# Patient Record
Sex: FEMALE | Race: OTHER | HISPANIC OR LATINO | URBAN - METROPOLITAN AREA
[De-identification: names, ages, dates, MRNs, and addresses within clinical notes are randomized per-mention and may not be internally consistent; named-entity substitution may affect disease eponyms.]

---

## 2019-10-18 ENCOUNTER — EMERGENCY (EMERGENCY)
Facility: HOSPITAL | Age: 39
LOS: 1 days | Discharge: HOME | End: 2019-10-18
Attending: EMERGENCY MEDICINE | Admitting: EMERGENCY MEDICINE
Payer: COMMERCIAL

## 2019-10-18 VITALS
SYSTOLIC BLOOD PRESSURE: 112 MMHG | HEART RATE: 100 BPM | HEIGHT: 62 IN | OXYGEN SATURATION: 100 % | RESPIRATION RATE: 18 BRPM | WEIGHT: 89.07 LBS | TEMPERATURE: 98 F | DIASTOLIC BLOOD PRESSURE: 72 MMHG

## 2019-10-18 DIAGNOSIS — N75.0 CYST OF BARTHOLIN'S GLAND: ICD-10-CM

## 2019-10-18 DIAGNOSIS — R10.2 PELVIC AND PERINEAL PAIN: ICD-10-CM

## 2019-10-18 PROCEDURE — 56420 I&D BARTHOLINS GLAND ABSCESS: CPT | Mod: RT

## 2019-10-18 PROCEDURE — 99283 EMERGENCY DEPT VISIT LOW MDM: CPT | Mod: 25

## 2019-10-18 RX ORDER — OXYCODONE AND ACETAMINOPHEN 5; 325 MG/1; MG/1
1 TABLET ORAL ONCE
Refills: 0 | Status: DISCONTINUED | OUTPATIENT
Start: 2019-10-18 | End: 2019-10-18

## 2019-10-18 RX ADMIN — OXYCODONE AND ACETAMINOPHEN 1 TABLET(S): 5; 325 TABLET ORAL at 15:38

## 2019-10-18 RX ADMIN — OXYCODONE AND ACETAMINOPHEN 1 TABLET(S): 5; 325 TABLET ORAL at 15:35

## 2019-10-18 NOTE — ED PROVIDER NOTE - CROS ED ROS STATEMENT
rn spoke with dr Adan Martínez regarding pt wearing vent at  Night, he states to transfer pt back to intermediate, to call with further orders and call jonathon freed for vent settings, physician also states to call him when pt arrives to icu for further orders  Electronically signed by José Miguel Weller RN on 9/10/2019 at 8:53 PM all other ROS negative except as per HPI

## 2019-10-18 NOTE — ED PROVIDER NOTE - PATIENT PORTAL LINK FT
You can access the FollowMyHealth Patient Portal offered by [facility] by registering at the following website: [email or web address]. By joining [employer] [facility]’s FollowMyHealth portal, you will also be able to view your health information using other applications (apps) compatible with our system.

## 2019-10-18 NOTE — ED PROVIDER NOTE - NSFOLLOWUPINSTRUCTIONS_ED_ALL_ED_FT
Incisión y drenaje    LO QUE NECESITA SABER:    La incisión y el drenaje conforman un procedimiento para drenar nanda bolsa de líquido, akash pus o marlee.    INSTRUCCIONES SOBRE EL ENRIQUE HOSPITALARIA:    Llame a guzman médico si:    Usted tiene líneas lara o dolor extremo cerca de guzman herida.      La marlee empapa el vendaje.      Usted tiene dolor en guzman espalda, estómago, músculos o articulaciones.      Usted tiene fiebre o escalofríos.      Se siente más cansado que de costumbre.      El líquido se vuelve a acumular y crea nanda bolsa en la misma área.      Guzman herida se pone desiree, inflamada y adolorida.      Usted tiene preguntas o inquietudes acerca de guzman condición o cuidado.    Medicamentos:Es posible que usted necesite alguno de los siguientes:     Los DEBORAH,akash el ibuprofeno, ayudan a disminuir la inflamación, el dolor y la fiebre. Raymond medicamento está disponible con o sin nanda receta médica. Los DEBORAH pueden causar sangrado estomacal o problemas renales en ciertas personas. Si usted esta tomando un anticoágulante, siempre pregunte si los AINEs son seguros para usted. Siempre danny la etiqueta de raymond medicamento y siga las instrucciones. No administre raymond medicamento a niños menores de 6 meses de alis sin antes obtener la autorización de guzman médico.      Puede administrarsepodrían administrarse. Pregunte al médico cómo debe navin raymond medicamento de forma carson. Algunos medicamentos recetados para el dolor contienen acetaminofén. No tome otros medicamentos que contengan acetaminofén sin consultarlo con guzman médico. Demasiado acetaminofeno puede causar daño al hígado. Los medicamentos recetados para el dolor podrían causar estreñimiento. Pregunte a guzman médico akash prevenir o tratar estreñimiento.      [place] nic medicamentos akash se le haya indicado.Consulte con guzman médico si usted abeba que guzman medicamento no le está ayudando o si presenta efectos secundarios. Infórmele si es alérgico a cualquier medicamento. Mantenga nanda lista actualizada de los medicamentos, las vitaminas y los productos herbales que marino. Incluya los siguientes datos de los medicamentos: cantidad, frecuencia y motivo de administración. Traiga con usted la lista o los envases de las píldoras a nic citas de seguimiento. Lleve la lista de los medicamentos con usted en ulises de nanda emergencia.    Cuidado de la herida:Mantenga guzman herida limpia y seca akash lo indique guzman médico:     Lávese las manosantes y después de tocar o limpiar guzman herida.Lavado de henry           Lave o enjuague guzman heridasegún las indicaciones.      Favor de revisar guzman heridapara signos de infección, akash enrojecimiento, inflamación y dolor.      Cambie las gasas o vendassegún las indicaciones. Solicite más información sobre el tipo de vendas que debe usar.    Eleve la herida:Si guzman herida está en guzman brazo o pierna, manténgala elevada por encima del nivel de guzman corazón con la frecuencia posible. Orchid va a disminuir inflamación y el dolor. Apoye guzman brazo o pierna sobre almohadas o cobijas para mantenerlo elevado y cómodo.    Use nanda férula akash se le indique:Puede que usted necesite usar nanda férula si guzman herida está en guzman mano, brazo o pierna. La férula limita el movimiento y le ayuda a guzman herida a sanar. No se quite la férula hasta que guzman médico lo indique.    Acuda a nanda consulta de control con guzman médico dentro de 1 a 3 días, o según le hayan indicado:Puede ser que usted necesite regresar a que le limpien la incisión y le cambien las vendas. Lleve nanda lista de nic preguntas para que recuerde hacerlas charis las citas.

## 2019-10-18 NOTE — ED PROVIDER NOTE - CLINICAL SUMMARY MEDICAL DECISION MAKING FREE TEXT BOX
38 y/o f presents with bartholin cyst; already started to drain, opening widened by ED provider, will d/c on augmentin, sitz baths, f/u gyn

## 2019-10-18 NOTE — ED ADULT NURSE NOTE - NSIMPLEMENTINTERV_GEN_ALL_ED
Implemented All Universal Safety Interventions:  [place] to call system. Call bell, personal items and telephone within reach. Instruct patient to call for assistance. Room bathroom lighting operational. Non-slip footwear when patient is off stretcher. Physically safe environment: no spills, clutter or unnecessary equipment. Stretcher in lowest position, wheels locked, appropriate side rails in place.

## 2019-10-18 NOTE — ED PROVIDER NOTE - ATTENDING CONTRIBUTION TO CARE
bartholin abscess.  no systemic symptoms.  already on antibiotic.  I and d done with copious pus extracted.  outpatient gyn f/u

## 2019-10-18 NOTE — ED ADULT TRIAGE NOTE - CHIEF COMPLAINT QUOTE
c/o vaginal pain and reported was at [facility] fro Bartholin cyst currently on Bactrim.

## 2019-10-18 NOTE — ED ADULT NURSE NOTE - CHIEF COMPLAINT QUOTE
c/o vaginal pain and reported was at [facility] fro Bartholin cyst currently on Bactrim.

## 2019-10-18 NOTE — ED ADULT NURSE NOTE - OBJECTIVE STATEMENT
pt with right labia cyst and swelling. pt was seen [facility], Bactrim was given but with no relief.

## 2019-10-18 NOTE — ED ADULT TRIAGE NOTE - LANGUAGE ASSISTANCE NEEDED
No-Patient/Caregiver offered and refused free interpretation services./ can speak enough English for quick triage

## 2019-10-18 NOTE — ED PROVIDER NOTE - OBJECTIVE STATEMENT
38 y/o f presents with infected bartholin cyst on right side of vagina.   translating for pt reports has been for 4 days, on bactrim, went to a GYN clinic today and were referred to hospital.  Denies fever, chills, all other ROS negative.
